# Patient Record
Sex: MALE | Race: WHITE | Employment: UNEMPLOYED | URBAN - METROPOLITAN AREA
[De-identification: names, ages, dates, MRNs, and addresses within clinical notes are randomized per-mention and may not be internally consistent; named-entity substitution may affect disease eponyms.]

---

## 2017-06-20 ENCOUNTER — ANESTHESIA EVENT (OUTPATIENT)
Dept: SURGERY | Age: 52
End: 2017-06-20
Payer: MEDICAID

## 2017-06-21 ENCOUNTER — APPOINTMENT (OUTPATIENT)
Dept: GENERAL RADIOLOGY | Age: 52
End: 2017-06-21
Attending: ORTHOPAEDIC SURGERY
Payer: MEDICAID

## 2017-06-21 ENCOUNTER — ANESTHESIA (OUTPATIENT)
Dept: SURGERY | Age: 52
End: 2017-06-21
Payer: MEDICAID

## 2017-06-21 ENCOUNTER — HOSPITAL ENCOUNTER (OUTPATIENT)
Age: 52
Setting detail: OUTPATIENT SURGERY
Discharge: HOME | End: 2017-06-21
Attending: ORTHOPAEDIC SURGERY | Admitting: ORTHOPAEDIC SURGERY
Payer: MEDICAID

## 2017-06-21 VITALS
OXYGEN SATURATION: 96 % | WEIGHT: 243 LBS | DIASTOLIC BLOOD PRESSURE: 83 MMHG | HEIGHT: 75 IN | HEART RATE: 74 BPM | TEMPERATURE: 97.8 F | BODY MASS INDEX: 30.21 KG/M2 | SYSTOLIC BLOOD PRESSURE: 129 MMHG | RESPIRATION RATE: 16 BRPM

## 2017-06-21 DIAGNOSIS — S43.101A: Primary | ICD-10-CM

## 2017-06-21 PROCEDURE — 74011250636 HC RX REV CODE- 250/636: Performed by: ORTHOPAEDIC SURGERY

## 2017-06-21 PROCEDURE — 74011000250 HC RX REV CODE- 250: Performed by: ANESTHESIOLOGY

## 2017-06-21 PROCEDURE — 76210000016 HC OR PH I REC 1 TO 1.5 HR: Performed by: ORTHOPAEDIC SURGERY

## 2017-06-21 PROCEDURE — 77030020782 HC GWN BAIR PAWS FLX 3M -B: Performed by: ORTHOPAEDIC SURGERY

## 2017-06-21 PROCEDURE — 77030011265 HC ELECTRD BLD HEX COVD -A: Performed by: ORTHOPAEDIC SURGERY

## 2017-06-21 PROCEDURE — 77030012508 HC MSK AIRWY LMA AMBU -A: Performed by: ANESTHESIOLOGY

## 2017-06-21 PROCEDURE — 74011250637 HC RX REV CODE- 250/637: Performed by: ANESTHESIOLOGY

## 2017-06-21 PROCEDURE — 76210000021 HC REC RM PH II 0.5 TO 1 HR: Performed by: ORTHOPAEDIC SURGERY

## 2017-06-21 PROCEDURE — 74011250636 HC RX REV CODE- 250/636

## 2017-06-21 PROCEDURE — 74011000272 HC RX REV CODE- 272: Performed by: ORTHOPAEDIC SURGERY

## 2017-06-21 PROCEDURE — 77030032490 HC SLV COMPR SCD KNE COVD -B: Performed by: ORTHOPAEDIC SURGERY

## 2017-06-21 PROCEDURE — 74011000250 HC RX REV CODE- 250

## 2017-06-21 PROCEDURE — 77030011283 HC ELECTRD NDL COVD -A: Performed by: ORTHOPAEDIC SURGERY

## 2017-06-21 PROCEDURE — 77030028990 HC ADH TISS DERMFLX CHMP -B: Performed by: ORTHOPAEDIC SURGERY

## 2017-06-21 PROCEDURE — 76060000035 HC ANESTHESIA 2 TO 2.5 HR: Performed by: ORTHOPAEDIC SURGERY

## 2017-06-21 PROCEDURE — 77030002922 HC SUT FBRWRE ARTH -B: Performed by: ORTHOPAEDIC SURGERY

## 2017-06-21 PROCEDURE — 77030031139 HC SUT VCRL2 J&J -A: Performed by: ORTHOPAEDIC SURGERY

## 2017-06-21 PROCEDURE — 77030036565 HC WRP CLDTHER ANK S2SG -A: Performed by: ORTHOPAEDIC SURGERY

## 2017-06-21 PROCEDURE — 74011000250 HC RX REV CODE- 250: Performed by: ORTHOPAEDIC SURGERY

## 2017-06-21 PROCEDURE — 74011250636 HC RX REV CODE- 250/636: Performed by: ANESTHESIOLOGY

## 2017-06-21 PROCEDURE — 77030018836 HC SOL IRR NACL ICUM -A: Performed by: ORTHOPAEDIC SURGERY

## 2017-06-21 PROCEDURE — 77030002919 HC SUT FBRTAPE ARTH -B: Performed by: ORTHOPAEDIC SURGERY

## 2017-06-21 PROCEDURE — 77030037875 HC DRSG MEPILEX <16IN BORD MOLN -A: Performed by: ORTHOPAEDIC SURGERY

## 2017-06-21 PROCEDURE — 77030031140 HC SUT VCRL3 J&J -A: Performed by: ORTHOPAEDIC SURGERY

## 2017-06-21 PROCEDURE — 77030020269 HC MISC IMPL: Performed by: ORTHOPAEDIC SURGERY

## 2017-06-21 PROCEDURE — 76010000131 HC OR TIME 2 TO 2.5 HR: Performed by: ORTHOPAEDIC SURGERY

## 2017-06-21 PROCEDURE — 77030027138 HC INCENT SPIROMETER -A: Performed by: ORTHOPAEDIC SURGERY

## 2017-06-21 PROCEDURE — 77030002933 HC SUT MCRYL J&J -A: Performed by: ORTHOPAEDIC SURGERY

## 2017-06-21 RX ORDER — FENTANYL CITRATE 50 UG/ML
INJECTION, SOLUTION INTRAMUSCULAR; INTRAVENOUS AS NEEDED
Status: DISCONTINUED | OUTPATIENT
Start: 2017-06-21 | End: 2017-06-21 | Stop reason: HOSPADM

## 2017-06-21 RX ORDER — PAROXETINE HYDROCHLORIDE 20 MG/1
20 TABLET, FILM COATED ORAL 2 TIMES DAILY
Status: CANCELLED | OUTPATIENT
Start: 2017-06-21

## 2017-06-21 RX ORDER — SODIUM CHLORIDE, SODIUM LACTATE, POTASSIUM CHLORIDE, CALCIUM CHLORIDE 600; 310; 30; 20 MG/100ML; MG/100ML; MG/100ML; MG/100ML
50 INJECTION, SOLUTION INTRAVENOUS CONTINUOUS
Status: DISCONTINUED | OUTPATIENT
Start: 2017-06-21 | End: 2017-06-21 | Stop reason: HOSPADM

## 2017-06-21 RX ORDER — LEVETIRACETAM 500 MG/1
2000 TABLET ORAL 2 TIMES DAILY
Status: CANCELLED | OUTPATIENT
Start: 2017-06-21

## 2017-06-21 RX ORDER — OXYBUTYNIN CHLORIDE 5 MG/1
5 TABLET ORAL
Status: CANCELLED | OUTPATIENT
Start: 2017-06-22

## 2017-06-21 RX ORDER — ONDANSETRON 2 MG/ML
INJECTION INTRAMUSCULAR; INTRAVENOUS AS NEEDED
Status: DISCONTINUED | OUTPATIENT
Start: 2017-06-21 | End: 2017-06-21 | Stop reason: HOSPADM

## 2017-06-21 RX ORDER — OXYCODONE AND ACETAMINOPHEN 10; 325 MG/1; MG/1
1 TABLET ORAL
Status: CANCELLED | OUTPATIENT
Start: 2017-06-21

## 2017-06-21 RX ORDER — PHENOBARBITAL 32.4 MG/1
129.6 TABLET ORAL
Status: CANCELLED | OUTPATIENT
Start: 2017-06-21

## 2017-06-21 RX ORDER — SODIUM CHLORIDE, SODIUM LACTATE, POTASSIUM CHLORIDE, CALCIUM CHLORIDE 600; 310; 30; 20 MG/100ML; MG/100ML; MG/100ML; MG/100ML
125 INJECTION, SOLUTION INTRAVENOUS CONTINUOUS
Status: DISCONTINUED | OUTPATIENT
Start: 2017-06-21 | End: 2017-06-21 | Stop reason: HOSPADM

## 2017-06-21 RX ORDER — FOLIC ACID 1 MG/1
1 TABLET ORAL DAILY
Status: CANCELLED | OUTPATIENT
Start: 2017-06-21

## 2017-06-21 RX ORDER — DEXAMETHASONE SODIUM PHOSPHATE 4 MG/ML
INJECTION, SOLUTION INTRA-ARTICULAR; INTRALESIONAL; INTRAMUSCULAR; INTRAVENOUS; SOFT TISSUE AS NEEDED
Status: DISCONTINUED | OUTPATIENT
Start: 2017-06-21 | End: 2017-06-21 | Stop reason: HOSPADM

## 2017-06-21 RX ORDER — ONDANSETRON 2 MG/ML
4 INJECTION INTRAMUSCULAR; INTRAVENOUS ONCE
Status: DISCONTINUED | OUTPATIENT
Start: 2017-06-21 | End: 2017-06-21 | Stop reason: HOSPADM

## 2017-06-21 RX ORDER — OXYBUTYNIN CHLORIDE 5 MG/1
10 TABLET ORAL
Status: CANCELLED | OUTPATIENT
Start: 2017-06-21

## 2017-06-21 RX ORDER — FENTANYL CITRATE 50 UG/ML
25 INJECTION, SOLUTION INTRAMUSCULAR; INTRAVENOUS
Status: DISPENSED | OUTPATIENT
Start: 2017-06-21 | End: 2017-06-21

## 2017-06-21 RX ORDER — MAGNESIUM SULFATE 100 %
4 CRYSTALS MISCELLANEOUS AS NEEDED
Status: DISCONTINUED | OUTPATIENT
Start: 2017-06-21 | End: 2017-06-21 | Stop reason: HOSPADM

## 2017-06-21 RX ORDER — OXYCODONE AND ACETAMINOPHEN 10; 325 MG/1; MG/1
1 TABLET ORAL
Qty: 40 TAB | Refills: 0 | Status: SHIPPED | OUTPATIENT
Start: 2017-06-21 | End: 2018-06-28

## 2017-06-21 RX ORDER — LORAZEPAM 1 MG/1
1 TABLET ORAL
Status: CANCELLED | OUTPATIENT
Start: 2017-06-21

## 2017-06-21 RX ORDER — INSULIN LISPRO 100 [IU]/ML
INJECTION, SOLUTION INTRAVENOUS; SUBCUTANEOUS ONCE
Status: DISCONTINUED | OUTPATIENT
Start: 2017-06-21 | End: 2017-06-21 | Stop reason: HOSPADM

## 2017-06-21 RX ORDER — GLYCOPYRROLATE 0.2 MG/ML
INJECTION INTRAMUSCULAR; INTRAVENOUS AS NEEDED
Status: DISCONTINUED | OUTPATIENT
Start: 2017-06-21 | End: 2017-06-21 | Stop reason: HOSPADM

## 2017-06-21 RX ORDER — FOLIC ACID 1 MG/1
2 TABLET ORAL
Status: CANCELLED | OUTPATIENT
Start: 2017-06-21

## 2017-06-21 RX ORDER — PHENOBARBITAL 32.4 MG/1
64.8 TABLET ORAL
Status: CANCELLED | OUTPATIENT
Start: 2017-06-22

## 2017-06-21 RX ORDER — LIDOCAINE HYDROCHLORIDE 20 MG/ML
INJECTION, SOLUTION EPIDURAL; INFILTRATION; INTRACAUDAL; PERINEURAL AS NEEDED
Status: DISCONTINUED | OUTPATIENT
Start: 2017-06-21 | End: 2017-06-21 | Stop reason: HOSPADM

## 2017-06-21 RX ORDER — SODIUM CHLORIDE 0.9 % (FLUSH) 0.9 %
5-10 SYRINGE (ML) INJECTION AS NEEDED
Status: DISCONTINUED | OUTPATIENT
Start: 2017-06-21 | End: 2017-06-21 | Stop reason: HOSPADM

## 2017-06-21 RX ORDER — HYDROMORPHONE HYDROCHLORIDE 1 MG/ML
0.5 INJECTION, SOLUTION INTRAMUSCULAR; INTRAVENOUS; SUBCUTANEOUS
Status: DISCONTINUED | OUTPATIENT
Start: 2017-06-21 | End: 2017-06-21 | Stop reason: HOSPADM

## 2017-06-21 RX ORDER — PROPOFOL 10 MG/ML
INJECTION, EMULSION INTRAVENOUS AS NEEDED
Status: DISCONTINUED | OUTPATIENT
Start: 2017-06-21 | End: 2017-06-21 | Stop reason: HOSPADM

## 2017-06-21 RX ORDER — LABETALOL HYDROCHLORIDE 5 MG/ML
10 INJECTION, SOLUTION INTRAVENOUS ONCE
Status: COMPLETED | OUTPATIENT
Start: 2017-06-21 | End: 2017-06-21

## 2017-06-21 RX ORDER — CEFAZOLIN SODIUM 2 G/50ML
2 SOLUTION INTRAVENOUS ONCE
Status: COMPLETED | OUTPATIENT
Start: 2017-06-21 | End: 2017-06-21

## 2017-06-21 RX ORDER — OXYCODONE AND ACETAMINOPHEN 5; 325 MG/1; MG/1
1 TABLET ORAL AS NEEDED
Status: DISCONTINUED | OUTPATIENT
Start: 2017-06-21 | End: 2017-06-21 | Stop reason: HOSPADM

## 2017-06-21 RX ORDER — MIDAZOLAM HYDROCHLORIDE 1 MG/ML
INJECTION, SOLUTION INTRAMUSCULAR; INTRAVENOUS AS NEEDED
Status: DISCONTINUED | OUTPATIENT
Start: 2017-06-21 | End: 2017-06-21 | Stop reason: HOSPADM

## 2017-06-21 RX ORDER — NALOXONE HYDROCHLORIDE 0.4 MG/ML
0.1 INJECTION, SOLUTION INTRAMUSCULAR; INTRAVENOUS; SUBCUTANEOUS
Status: DISCONTINUED | OUTPATIENT
Start: 2017-06-21 | End: 2017-06-21 | Stop reason: HOSPADM

## 2017-06-21 RX ORDER — DEXTROSE 50 % IN WATER (D50W) INTRAVENOUS SYRINGE
25-50 AS NEEDED
Status: DISCONTINUED | OUTPATIENT
Start: 2017-06-21 | End: 2017-06-21 | Stop reason: HOSPADM

## 2017-06-21 RX ADMIN — SODIUM CHLORIDE, SODIUM LACTATE, POTASSIUM CHLORIDE, AND CALCIUM CHLORIDE 125 ML/HR: 600; 310; 30; 20 INJECTION, SOLUTION INTRAVENOUS at 06:34

## 2017-06-21 RX ADMIN — MIDAZOLAM HYDROCHLORIDE 2 MG: 1 INJECTION, SOLUTION INTRAMUSCULAR; INTRAVENOUS at 07:33

## 2017-06-21 RX ADMIN — HYDROMORPHONE HYDROCHLORIDE 0.5 MG: 1 INJECTION, SOLUTION INTRAMUSCULAR; INTRAVENOUS; SUBCUTANEOUS at 10:33

## 2017-06-21 RX ADMIN — LABETALOL HYDROCHLORIDE 10 MG: 5 INJECTION, SOLUTION INTRAVENOUS at 10:33

## 2017-06-21 RX ADMIN — FENTANYL CITRATE 25 MCG: 50 INJECTION, SOLUTION INTRAMUSCULAR; INTRAVENOUS at 10:44

## 2017-06-21 RX ADMIN — FENTANYL CITRATE 50 MCG: 50 INJECTION, SOLUTION INTRAMUSCULAR; INTRAVENOUS at 09:12

## 2017-06-21 RX ADMIN — DEXAMETHASONE SODIUM PHOSPHATE 4 MG: 4 INJECTION, SOLUTION INTRA-ARTICULAR; INTRALESIONAL; INTRAMUSCULAR; INTRAVENOUS; SOFT TISSUE at 07:45

## 2017-06-21 RX ADMIN — FENTANYL CITRATE 100 MCG: 50 INJECTION, SOLUTION INTRAMUSCULAR; INTRAVENOUS at 07:42

## 2017-06-21 RX ADMIN — SODIUM CHLORIDE, SODIUM LACTATE, POTASSIUM CHLORIDE, AND CALCIUM CHLORIDE: 600; 310; 30; 20 INJECTION, SOLUTION INTRAVENOUS at 08:28

## 2017-06-21 RX ADMIN — FENTANYL CITRATE 50 MCG: 50 INJECTION, SOLUTION INTRAMUSCULAR; INTRAVENOUS at 08:27

## 2017-06-21 RX ADMIN — OXYCODONE AND ACETAMINOPHEN 1 TABLET: 5; 325 TABLET ORAL at 11:39

## 2017-06-21 RX ADMIN — CEFAZOLIN SODIUM 2 G: 2 SOLUTION INTRAVENOUS at 07:35

## 2017-06-21 RX ADMIN — FENTANYL CITRATE 50 MCG: 50 INJECTION, SOLUTION INTRAMUSCULAR; INTRAVENOUS at 09:15

## 2017-06-21 RX ADMIN — ONDANSETRON 4 MG: 2 INJECTION INTRAMUSCULAR; INTRAVENOUS at 07:45

## 2017-06-21 RX ADMIN — FENTANYL CITRATE 50 MCG: 50 INJECTION, SOLUTION INTRAMUSCULAR; INTRAVENOUS at 08:49

## 2017-06-21 RX ADMIN — HYDROMORPHONE HYDROCHLORIDE 0.5 MG: 1 INJECTION, SOLUTION INTRAMUSCULAR; INTRAVENOUS; SUBCUTANEOUS at 10:43

## 2017-06-21 RX ADMIN — LIDOCAINE HYDROCHLORIDE 80 MG: 20 INJECTION, SOLUTION EPIDURAL; INFILTRATION; INTRACAUDAL; PERINEURAL at 07:42

## 2017-06-21 RX ADMIN — GLYCOPYRROLATE 0.2 MG: 0.2 INJECTION INTRAMUSCULAR; INTRAVENOUS at 07:33

## 2017-06-21 RX ADMIN — PROPOFOL 200 MG: 10 INJECTION, EMULSION INTRAVENOUS at 07:42

## 2017-06-21 NOTE — OP NOTES
[address] [place] [address]  OPERATIVE REPORT    Name:  Kassi Curry  MR#:  [id number]  :  1965  Account #:  [de-identified]  Date of Adm:  2017  Date of Surgery:  2017      PREOPERATIVE DIAGNOSIS: Type 3 acromioclavicular separation,  right. POSTOPERATIVE DIAGNOSIS: Type 3 acromioclavicular separation,  right. PROCEDURES PERFORMED: Repair of a right acromioclavicular  joint with allograft and FiberWire fixation. ESTIMATED BLOOD LOSS: 10 mL. SPECIMENS REMOVED: None. ANESTHESIA: General.    INDICATIONS FOR THE PROCEDURE: The patient is a 58-year-old  right hand dominant gentleman who had increasing pain in his  shoulder. Radiographs demonstrated a type 3 AC separation. This  injury was approximately 1 month ago. The indications, risks, benefits,  complications, alternative forms of therapy, and expected outcomes  were explained. He seems to understand and agrees to proceed. DESCRIPTION OF OPERATION: The patient was brought to the  operating room and after successful induction of anesthesia, the  patient's right upper extremity was prepped and draped with  ChloraPrep solution. An incision was started at the level of the clavicle,  3 cm proximal to the [facility] joint. The deltoid was split. The medial and  lateral margins of the coracoid process were identified. The tip of the  coracoid was subsequently identified. The guide was placed and the  footprint of the guide was placed on the undersurface of the coracoid  and confirmed on C-arm. A 3 mm wire was drilled through both  cortices of the clavicle and both cortices of the coracoid. The guidewire  was the initial suture with the Dog Bone and the button was placed on  the undersurface of the coracoid and well fixed. The second Dog Bone  EndoButton was placed in a parallel fashion with the clavicle. The  sutures were  passed and paired. The [facility] joint was reduced and the  sutures were tied.  There was an anatomical reduction of the [facility] joint. A suture passer was then used to guide the passage of the end of the  retention sutures for the allograft. These were brought through the  posterior aspect of the clavicle. The end of the graft was then placed  anteriorly to the clavicle. The 2 ends were then pulled in opposite  direction in order to further augment the repair and the graft was sewn  together using interrupted sutures of #2 FiberWire. The wound was  copiously irrigated and drained. The subcutaneous tissue was repaired  using interrupted sutures of 3-0 Vicryl and the skin was repaired using  a 3-0 Monocryl. Suture line was reinforced with Steri-Strips and Prineo  and sterile dressing was applied. No complications were encountered. The patient was placed in a sling. He tolerated the procedure well and  was wheeled to the recovery room in stable condition.         Melo Torres MD DC / Miles Burns  D:  06/21/2017   10:01  T:  06/21/2017   10:34  Job #:  [id number]

## 2017-06-21 NOTE — ANESTHESIA PREPROCEDURE EVALUATION
Anesthetic History   No history of anesthetic complications            Review of Systems / Medical History  Patient summary reviewed, nursing notes reviewed and pertinent labs reviewed    Pulmonary    COPD    Sleep apnea           Neuro/Psych     seizures    Psychiatric history     Cardiovascular  Within defined limits                     GI/Hepatic/Renal  Within defined limits              Endo/Other        Arthritis     Other Findings              Physical Exam    Airway  Mallampati: IV  TM Distance: 4 - 6 cm  Neck ROM: normal range of motion   Mouth opening: Normal     Cardiovascular  Regular rate and rhythm,  S1 and S2 normal,  no murmur, click, rub, or gallop             Dental    Dentition: Poor dentition     Pulmonary  Breath sounds clear to auscultation               Abdominal  Abdominal exam normal       Other Findings            Anesthetic Plan    ASA: 3  Anesthesia type: general          Induction: Intravenous  Anesthetic plan and risks discussed with: Family and Patient

## 2017-06-21 NOTE — PERIOP NOTES
Handoff Report from Operating Room to PACU   Report received from [place], CRNA and Melinda Mcghee RN regarding patient, Savannah Travis . Surgeon(s): MD Arben and Procedure confirmed with allergies and dressings discussed. Anesthesia type, drugs, patient history, complications, estimated blood loss, vital signs, intake and output, and last pain medication, lines, reversal medications and temperature were reviewed. PACU monitoring initiated. Non-rebreather mask with 10 liters 02 applied. 02Sat 100%. Patient is drowsy, able to Follow commands. Dressing to right shoulder CDI, PIV #20 g  Left hand, infusing without difficulty. See Doc flowsheet for physical assessment details.    Analisa Boston RN'

## 2017-06-21 NOTE — ANESTHESIA POSTPROCEDURE EVALUATION
Post-Anesthesia Evaluation and Assessment    Cardiovascular Function/Vital Signs  Visit Vitals    /84    Pulse 75    Temp 36.1 °C (97 °F)    Resp 11    Ht 6' 3\" (1.905 m)    Wt 110.2 kg (243 lb)    SpO2 97%    BMI 30.37 kg/m2       Patient is status post Procedure(s):  OPEN REDUCTION INTERNAL FIXATION RIGHT ACROMLOCLAVICULAR JOINT W/C-ARM. Nausea/Vomiting: Controlled. Postoperative hydration reviewed and adequate. Pain:  Pain Scale 1: Numeric (0 - 10) (06/21/17 1021)  Pain Intensity 1: 0 (06/21/17 1021)   Managed. Neurological Status:   Neuro (WDL): Within Defined Limits (06/21/17 1021)   At baseline. Mental Status and Level of Consciousness: Baseline and stable. Pulmonary Status:   O2 Device: Room air (06/21/17 1021)   Adequate oxygenation and airway patent. Complications related to anesthesia: None    Post-anesthesia assessment completed. No concerns. Patient has met all discharge requirements.     Signed By: Gem Foley MD

## 2017-06-21 NOTE — DISCHARGE INSTRUCTIONS
Follow all post op instructions from Dr. Nghia López office with any specific post op questions or concerns at [phone] - [id number]  Take prescription as directed  Ice pack to surgical site on and off x 48 hours  Avoid heavy lifting- pushing, pulling with right arm  Follow up with Dr. Jamaal López in 1 week            DISCHARGE SUMMARY from Nurse    The following personal items are in your possession at time of discharge:       Visual Aid: None     Home Medications: Sent home (given to wife)  Jewelry: Ring (given to spouse)                   PATIENT INSTRUCTIONS:    After general anesthesia or intravenous sedation, for 24 hours or while taking prescription Narcotics:  · Limit your activities  · Do not drive and operate hazardous machinery  · Do not make important personal or business decisions  · Do  not drink alcoholic beverages  · If you have not urinated within 8 hours after discharge, please contact your surgeon on call. Report the following to your surgeon:  · Excessive pain, swelling, redness or odor of or around the surgical area  · Temperature over 100.5  · Nausea and vomiting lasting longer than 4 hours or if unable to take medications  · Any signs of decreased circulation or nerve impairment to extremity: change in color, persistent  numbness, tingling, coldness or increase pain  · Any questions        What to do at Home:  Recommended activity: Ambulate in house, No lifting, Driving, or Strenuous exercise until advised and No heavy lifting until advised    If you experience any of the following symptoms fever, chills, uncontrollable pain, active bleeding, circulation changes, please follow up with Dr. Jamaal López. *  Please give a list of your current medications to your Primary Care Provider. *  Please update this list whenever your medications are discontinued, doses are      changed, or new medications (including over-the-counter products) are added.     *  Please carry medication information at all times in case of emergency situations. These are general instructions for a healthy lifestyle:    No smoking/ No tobacco products/ Avoid exposure to second hand smoke    Surgeon General's Warning:  Quitting smoking now greatly reduces serious risk to your health. Obesity, smoking, and sedentary lifestyle greatly increases your risk for illness    A healthy diet, regular physical exercise & weight monitoring are important for maintaining a healthy lifestyle    You may be retaining fluid if you have a history of heart failure or if you experience any of the following symptoms:  Weight gain of 3 pounds or more overnight or 5 pounds in a week, increased swelling in our hands or feet or shortness of breath while lying flat in bed. Please call your doctor as soon as you notice any of these symptoms; do not wait until your next office visit. Recognize signs and symptoms of STROKE:    F-face looks uneven    A-arms unable to move or move unevenly    S-speech slurred or non-existent    T-time-call 911 as soon as signs and symptoms begin-DO NOT go       Back to bed or wait to see if you get better-TIME IS BRAIN. Warning Signs of HEART ATTACK     Call 911 if you have these symptoms:   Chest discomfort. Most heart attacks involve discomfort in the center of the chest that lasts more than a few minutes, or that goes away and comes back. It can feel like uncomfortable pressure, squeezing, fullness, or pain.  Discomfort in other areas of the upper body. Symptoms can include pain or discomfort in one or both arms, the back, neck, jaw, or stomach.  Shortness of breath with or without chest discomfort.  Other signs may include breaking out in a cold sweat, nausea, or lightheadedness. Don't wait more than five minutes to call 911 - MINUTES MATTER! Fast action can save your life. Calling 911 is almost always the fastest way to get lifesaving treatment.  Emergency Medical Services staff can begin treatment when they arrive -- up to an hour sooner than if someone gets to the hospital by car. Patient armband removed and shredded    The discharge information has been reviewed with the patient and spouse. The patient and spouse verbalized understanding. Discharge medications reviewed with the patient and spouse and appropriate educational materials and side effects teaching were provided.

## 2017-06-21 NOTE — BRIEF OP NOTE
BRIEF OPERATIVE NOTE    Date of Procedure: 6/21/2017   Preoperative Diagnosis: Type III DISLOCATION OF RIGHT ACROMIOCLAVICULR JOINT INITIAL ENCOUNTER  Postoperative Diagnosis: Typr III DISLOCATION OF RIGHT ACROMIOCLAVICULR JOINT INITIAL ENCOUNTER    Procedure(s):  OPEN REDUCTION INTERNAL FIXATION RIGHT ACROMLOCLAVICULAR JOINT W/C-ARM  Surgeon(s) and Role:     * Tammy Cuellar MD - Primary         Assistant Staff:       Surgical Staff:  Circ-1: Katherine Hutchins  Circ-2: Alena Chapa RN  Radiology Technician: Dearl Minus  Scrub Tech-1: Yola Downey  Surg Asst-1: Taunya Romberg  Event Time In   Incision Start 0804   Incision Close 0951     Anesthesia: General   Estimated Blood Loss: minimal  Specimens: * No specimens in log *   Findings: ACJ separation   Complications: none  Implants:   Implant Name Type Inv.  Item Serial No.  Lot No. LRB No. Used Action   Implant System, Acute [facility] Repair    [employer] [id number] Right 1 Implanted   FlexiGraft Lateral Ankle     [id number]     Right 1 Implanted
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
## 2017-06-21 NOTE — IP AVS SNAPSHOT
Carl Osorio 
 
 
 [address] [facility] [address] 
[phone] Patient: Calton Rubinstein MRN: [id number] HKD:0/3/3701 You are allergic to the following Allergen Reactions Codeine Rash Recent Documentation Height Weight BMI Smoking Status 1.905 m 110.2 kg 30.37 kg/m2 Current Every Day Smoker Emergency Contacts Name Discharge Info Relation Home Work Mobile Any Mathews DISCHARGE CAREGIVER [3] Spouse [3] [phone] About your hospitalization You were admitted on:  June 21, 2017 [facility] You were discharged on:  June 21, 2017 Unit phone number:  [phone] Why you were hospitalized Your primary diagnosis was:  Not on File Providers Seen During Your Hospitalizations Provider Role Specialty Primary office phone Tammy Cuellar MD Attending Provider Orthopedic Surgery [phone] Your Primary Care Physician (PCP) Primary Care Physician Office Phone Office Fax Jsabir Ha [phone] Follow-up Information Follow up With Details Comments Contact Info [facility] [address] [place] [address] 
[phone] Tammy Cuellar MD Follow up in 1 week(s)  [address] [place] [address] [address] [place] [address] 
[phone] Current Discharge Medication List  
  
START taking these medications Dose & Instructions Dispensing Information Comments Morning Noon Evening Bedtime  
 oxyCODONE-acetaminophen  mg per tablet Commonly known as:  PERCOCET 10 Your last dose was: Your next dose is:    
   
   
 Dose:  1 Tab Take 1 Tab by mouth every six (6) hours as needed for Pain. Max Daily Amount: 4 Tabs. Indications: Pain Quantity:  40 Tab Refills:  0 CONTINUE these medications which have NOT CHANGED Dose & Instructions Dispensing Information Comments Morning Noon Evening Bedtime  
 albuterol 90 mcg/actuation inhaler Commonly known as:  PROVENTIL HFA, VENTOLIN HFA, PROAIR HFA Your last dose was: Your next dose is:    
   
   
 Dose:  2 Puff Take 2 Puffs by inhalation every four (4) hours as needed for Shortness of Breath (sob with coughing). Indications: Chronic Obstructive Pulmonary Disease Refills:  0  
     
   
   
   
  
 ATIVAN 1 mg tablet Generic drug:  LORazepam  
   
Your last dose was: Your next dose is:    
   
   
 Dose:  1 mg Take 1 mg by mouth every six (6) hours as needed for Anxiety. Indications: anxiety Refills:  0  
     
   
   
   
  
 * DILANTIN PO Your last dose was: Your next dose is:    
   
   
 Dose:  200 mg Take 200 mg by mouth Daily (before breakfast). Refills:  0  
     
   
   
   
  
 * DILANTIN PO Your last dose was: Your next dose is:    
   
   
 Dose:  300 mg Take 300 mg by mouth nightly. Refills:  0  
     
   
   
   
  
 * folic acid 1 mg tablet Commonly known as:  Robyn Your last dose was: Your next dose is:    
   
   
 Dose:  1 mg Take 1 mg by mouth daily. Refills:  0  
     
   
   
   
  
 * folic acid 1 mg tablet Commonly known as:  [employer] Your last dose was: Your next dose is:    
   
   
 Dose:  2 mg Take 2 mg by mouth nightly. Refills:  0 KEPPRA 1,000 mg tablet Generic drug:  levETIRAcetam  
   
Your last dose was: Your next dose is:    
   
   
 Dose:  2000 mg Take 2,000 mg by mouth two (2) times a day. Indications: TONIC-CLONIC EPILEPSY TREATMENT ADJUNCT Refills:  0 LIDODERM 5 % Generic drug:  lidocaine Your last dose was: Your next dose is:    
   
   
 Dose:  1 Patch 1 Patch by TransDERmal route every twenty-four (24) hours. Apply patch to the affected area for 12 hours a day and remove for 12 hours a day. One or two patches. One to back and or one to shoulder Refills:  0  
     
   
   
   
  
 * oxybutynin 5 mg tablet Commonly known as:  XBXGXAKF Your last dose was: Your next dose is:    
   
   
 Dose:  5 mg Take 5 mg by mouth Daily (before breakfast). Indications: NEUROGENIC BLADDER Refills:  0  
     
   
   
   
  
 * oxybutynin 5 mg tablet Commonly known as:  HIPPZAJT Your last dose was: Your next dose is:    
   
   
 Dose:  10 mg Take 10 mg by mouth nightly. Refills:  0 PAXIL 20 mg tablet Generic drug:  PARoxetine Your last dose was: Your next dose is:    
   
   
 Dose:  20 mg Take 20 mg by mouth two (2) times a day. Refills:  0  
     
   
   
   
  
 * PHENobarbital 64.8 mg tablet Commonly known as:  LUMINAL Your last dose was: Your next dose is:    
   
   
 Dose:  64.8 mg Take 64.8 mg by mouth Daily (before breakfast). Indications: seizure disorder Refills:  0  
     
   
   
   
  
 * PHENobarbital 64.8 mg tablet Commonly known as:  LUMINAL Your last dose was: Your next dose is:    
   
   
 Dose:  129.6 mg Take 129.6 mg by mouth nightly. Indications: seizure disorder Refills:  0  
     
   
   
   
  
 VITAMIN D3 2,000 unit Tab Generic drug:  cholecalciferol (vitamin D3) Your last dose was: Your next dose is:    
   
   
 Dose:  1 Tab Take 1 Tab by mouth daily. Refills:  0  
     
   
   
   
  
 * Notice: This list has 8 medication(s) that are the same as other medications prescribed for you. Read the directions carefully, and ask your doctor or other care provider to review them with you. Where to Get Your Medications Information on where to get these meds will be given to you by the nurse or doctor. ! Ask your nurse or doctor about these medications  
  oxyCODONE-acetaminophen  mg per tablet Discharge Instructions Follow all post op instructions from Dr. Dario Puga Contact Dr. Dario Puga office with any specific post op questions or concerns at [phone] Take prescription as directed Ice pack to surgical site on and off x 48 hours Avoid heavy lifting- pushing, pulling with right arm Follow up with Dr. Dario Puga in 1 week DISCHARGE SUMMARY from Nurse The following personal items are in your possession at time of discharge: 
 
  
Visual Aid: None Home Medications: Sent home (given to wife) Jewelry: Ring (given to spouse) PATIENT INSTRUCTIONS: 
 
 
F-face looks uneven A-arms unable to move or move unevenly S-speech slurred or non-existent T-time-call 911 as soon as signs and symptoms begin-DO NOT go Back to bed or wait to see if you get better-TIME IS BRAIN. Warning Signs of HEART ATTACK Call 911 if you have these symptoms: 
? Chest discomfort. Most heart attacks involve discomfort in the center of the chest that lasts more than a few minutes, or that goes away and comes back. It can feel like uncomfortable pressure, squeezing, fullness, or pain. ? Discomfort in other areas of the upper body. Symptoms can include pain or discomfort in one or both arms, the back, neck, jaw, or stomach. ? Shortness of breath with or without chest discomfort. ? Other signs may include breaking out in a cold sweat, nausea, or lightheadedness. Don't wait more than five minutes to call [address]! Fast action can save your life. Calling 911 is almost always the fastest way to get lifesaving treatment. Emergency Medical Services staff can begin treatment when they arrive  up to an hour sooner than if someone gets to the hospital by car. Patient armband removed and shredded The discharge information has been reviewed with the patient and spouse. The patient and spouse verbalized understanding. Discharge medications reviewed with the patient and spouse and appropriate educational materials and side effects teaching were provided. Discharge Orders Procedure Order Date Status Priority Quantity Spec Type Associated Dx CALL YOUR DOCTOR For: Redness, tenderness, or signs of infection. 06/21/17 1018 Normal Routine 1  Dislocation of clavicle, closed, right, initial encounter [[id number]] Questions: For:  Redness, tenderness, or signs of infection. ACTIVITY AFTER DISCHARGE Patient should: Other (specify) 06/21/17 1018 Normal Routine 1  Dislocation of clavicle, closed, right, initial encounter [[id number]] Schedule Instructions:  Maintain the sling until the f/u appointment Questions: Patient should: Other (specify) DIET REGULAR No added salt 06/21/17 1018 Normal Routine 1  Dislocation of clavicle, closed, right, initial encounter [[id number]] Questions: Additional options:  No added salt DRESSING, DO NOT REMOVE 06/21/17 1018 Normal Routine 1  Dislocation of clavicle, closed, right, initial encounter [[id number]] Comments:  Keep clean, dry and intact until next clinic visit. Introducing [facility]! [facility] introduces [employer] patient portal. Now you can access parts of your medical record, email your doctor's office, and request medication refills online. 1. In your internet browser, go to [email or web address] 2. Click on the First Time User? Click Here link in the Sign In box. You will see the New Member Sign Up page. 3. Enter your [employer] Access Code exactly as it appears below. You will not need to use this code after youve completed the sign-up process. If you do not sign up before the expiration date, you must request a new code. · [employer] Access Code: [email or web address] Expires: 9/19/2017  5:13 AM 
 
4. Enter the last four digits of your Social Security Number (xxxx) and Date of Birth (mm/dd/yyyy) as indicated and click Submit. You will be taken to the next sign-up page. 5. Create a [employer] ID. This will be your [employer] login ID and cannot be changed, so think of one that is secure and easy to remember. 6. Create a [employer] password. You can change your password at any time. 7. Enter your Password Reset Question and Answer. This can be used at a later time if you forget your password. 8. Enter your e-mail address. You will receive e-mail notification when new information is available in [address] 9. Click Sign Up. You can now view and download portions of your medical record. 10. Click the Download Summary menu link to download a portable copy of your medical information. If you have questions, please visit the Frequently Asked Questions section of the [employer] website. Remember, [employer] is NOT to be used for urgent needs. For medical emergencies, dial 911. Now available from your iPhone and Android! General Information Please provide this summary of care documentation to your next provider. Patient Signature:  ____________________________________________________________ Date:  ____________________________________________________________  
  
Faustino Dany Provider Signature:  ____________________________________________________________ Date:  ____________________________________________________________

## 2018-06-28 ENCOUNTER — ANESTHESIA EVENT (OUTPATIENT)
Dept: SURGERY | Age: 53
End: 2018-06-28
Payer: MEDICAID

## 2018-06-29 ENCOUNTER — HOSPITAL ENCOUNTER (OUTPATIENT)
Dept: GENERAL RADIOLOGY | Age: 53
Discharge: HOME | End: 2018-06-29
Attending: ORTHOPAEDIC SURGERY
Payer: MEDICAID

## 2018-06-29 ENCOUNTER — HOSPITAL ENCOUNTER (OUTPATIENT)
Age: 53
Setting detail: OUTPATIENT SURGERY
Discharge: HOME | End: 2018-06-29
Attending: ORTHOPAEDIC SURGERY | Admitting: ORTHOPAEDIC SURGERY
Payer: MEDICAID

## 2018-06-29 ENCOUNTER — ANESTHESIA (OUTPATIENT)
Dept: SURGERY | Age: 53
End: 2018-06-29
Payer: MEDICAID

## 2018-06-29 VITALS
TEMPERATURE: 97.3 F | DIASTOLIC BLOOD PRESSURE: 83 MMHG | BODY MASS INDEX: 31.11 KG/M2 | HEART RATE: 78 BPM | SYSTOLIC BLOOD PRESSURE: 139 MMHG | HEIGHT: 75 IN | WEIGHT: 250.19 LBS | OXYGEN SATURATION: 96 % | RESPIRATION RATE: 12 BRPM

## 2018-06-29 DIAGNOSIS — S42.009A: ICD-10-CM

## 2018-06-29 DIAGNOSIS — S42.022A CLOSED DISPLACED FRACTURE OF SHAFT OF LEFT CLAVICLE, INITIAL ENCOUNTER: Primary | ICD-10-CM

## 2018-06-29 PROCEDURE — 77030031139 HC SUT VCRL2 J&J -A: Performed by: ORTHOPAEDIC SURGERY

## 2018-06-29 PROCEDURE — 76010000149 HC OR TIME 1 TO 1.5 HR: Performed by: ORTHOPAEDIC SURGERY

## 2018-06-29 PROCEDURE — 77030032490 HC SLV COMPR SCD KNE COVD -B: Performed by: ORTHOPAEDIC SURGERY

## 2018-06-29 PROCEDURE — 74011250636 HC RX REV CODE- 250/636: Performed by: ANESTHESIOLOGY

## 2018-06-29 PROCEDURE — 77030020268 HC MISC GENERAL SUPPLY: Performed by: ORTHOPAEDIC SURGERY

## 2018-06-29 PROCEDURE — 76210000020 HC REC RM PH II FIRST 0.5 HR: Performed by: ORTHOPAEDIC SURGERY

## 2018-06-29 PROCEDURE — 77030011283 HC ELECTRD NDL COVD -A: Performed by: ORTHOPAEDIC SURGERY

## 2018-06-29 PROCEDURE — 77030018836 HC SOL IRR NACL ICUM -A: Performed by: ORTHOPAEDIC SURGERY

## 2018-06-29 PROCEDURE — 77030020269 HC MISC IMPL: Performed by: ORTHOPAEDIC SURGERY

## 2018-06-29 PROCEDURE — 74011000250 HC RX REV CODE- 250

## 2018-06-29 PROCEDURE — C1713 ANCHOR/SCREW BN/BN,TIS/BN: HCPCS | Performed by: ORTHOPAEDIC SURGERY

## 2018-06-29 PROCEDURE — 74011250636 HC RX REV CODE- 250/636

## 2018-06-29 PROCEDURE — 77030037875 HC DRSG MEPILEX <16IN BORD MOLN -A: Performed by: ORTHOPAEDIC SURGERY

## 2018-06-29 PROCEDURE — 77030020782 HC GWN BAIR PAWS FLX 3M -B: Performed by: ORTHOPAEDIC SURGERY

## 2018-06-29 PROCEDURE — 74011250636 HC RX REV CODE- 250/636: Performed by: ORTHOPAEDIC SURGERY

## 2018-06-29 PROCEDURE — 77030031140 HC SUT VCRL3 J&J -A: Performed by: ORTHOPAEDIC SURGERY

## 2018-06-29 PROCEDURE — 77030028990 HC ADH TISS DERMFLX CHMP -B: Performed by: ORTHOPAEDIC SURGERY

## 2018-06-29 PROCEDURE — 76210000016 HC OR PH I REC 1 TO 1.5 HR: Performed by: ORTHOPAEDIC SURGERY

## 2018-06-29 PROCEDURE — 77030003861 HC BIT DRL STRY -C: Performed by: ORTHOPAEDIC SURGERY

## 2018-06-29 PROCEDURE — 77030012508 HC MSK AIRWY LMA AMBU -A: Performed by: ANESTHESIOLOGY

## 2018-06-29 PROCEDURE — 74011000250 HC RX REV CODE- 250: Performed by: ORTHOPAEDIC SURGERY

## 2018-06-29 PROCEDURE — 77030038020 HC MANFLD NEPTUNE STRY -B: Performed by: ORTHOPAEDIC SURGERY

## 2018-06-29 PROCEDURE — 76060000033 HC ANESTHESIA 1 TO 1.5 HR: Performed by: ORTHOPAEDIC SURGERY

## 2018-06-29 PROCEDURE — 77030002933 HC SUT MCRYL J&J -A: Performed by: ORTHOPAEDIC SURGERY

## 2018-06-29 PROCEDURE — 77030011265 HC ELECTRD BLD HEX COVD -A: Performed by: ORTHOPAEDIC SURGERY

## 2018-06-29 DEVICE — LOCKING SCREW
Type: IMPLANTABLE DEVICE | Site: SHOULDER | Status: FUNCTIONAL
Brand: VARIAX

## 2018-06-29 RX ORDER — SODIUM CHLORIDE 0.9 % (FLUSH) 0.9 %
5-10 SYRINGE (ML) INJECTION AS NEEDED
Status: DISCONTINUED | OUTPATIENT
Start: 2018-06-29 | End: 2018-06-29 | Stop reason: HOSPADM

## 2018-06-29 RX ORDER — FENTANYL CITRATE 50 UG/ML
50 INJECTION, SOLUTION INTRAMUSCULAR; INTRAVENOUS
Status: DISCONTINUED | OUTPATIENT
Start: 2018-06-29 | End: 2018-06-29 | Stop reason: HOSPADM

## 2018-06-29 RX ORDER — FENTANYL CITRATE 50 UG/ML
INJECTION, SOLUTION INTRAMUSCULAR; INTRAVENOUS AS NEEDED
Status: DISCONTINUED | OUTPATIENT
Start: 2018-06-29 | End: 2018-06-29 | Stop reason: HOSPADM

## 2018-06-29 RX ORDER — GLYCOPYRROLATE 0.2 MG/ML
INJECTION INTRAMUSCULAR; INTRAVENOUS AS NEEDED
Status: DISCONTINUED | OUTPATIENT
Start: 2018-06-29 | End: 2018-06-29 | Stop reason: HOSPADM

## 2018-06-29 RX ORDER — SODIUM CHLORIDE, SODIUM LACTATE, POTASSIUM CHLORIDE, CALCIUM CHLORIDE 600; 310; 30; 20 MG/100ML; MG/100ML; MG/100ML; MG/100ML
125 INJECTION, SOLUTION INTRAVENOUS CONTINUOUS
Status: DISCONTINUED | OUTPATIENT
Start: 2018-06-29 | End: 2018-06-29 | Stop reason: HOSPADM

## 2018-06-29 RX ORDER — LIDOCAINE HYDROCHLORIDE 20 MG/ML
INJECTION, SOLUTION EPIDURAL; INFILTRATION; INTRACAUDAL; PERINEURAL AS NEEDED
Status: DISCONTINUED | OUTPATIENT
Start: 2018-06-29 | End: 2018-06-29 | Stop reason: HOSPADM

## 2018-06-29 RX ORDER — PROPOFOL 10 MG/ML
INJECTION, EMULSION INTRAVENOUS AS NEEDED
Status: DISCONTINUED | OUTPATIENT
Start: 2018-06-29 | End: 2018-06-29 | Stop reason: HOSPADM

## 2018-06-29 RX ORDER — ONDANSETRON 2 MG/ML
INJECTION INTRAMUSCULAR; INTRAVENOUS AS NEEDED
Status: DISCONTINUED | OUTPATIENT
Start: 2018-06-29 | End: 2018-06-29 | Stop reason: HOSPADM

## 2018-06-29 RX ORDER — CEFAZOLIN SODIUM/WATER 2 G/20 ML
2 SYRINGE (ML) INTRAVENOUS ONCE
Status: COMPLETED | OUTPATIENT
Start: 2018-06-29 | End: 2018-06-29

## 2018-06-29 RX ORDER — ACETAMINOPHEN 10 MG/ML
1000 INJECTION, SOLUTION INTRAVENOUS ONCE
Status: COMPLETED | OUTPATIENT
Start: 2018-06-29 | End: 2018-06-29

## 2018-06-29 RX ORDER — MIDAZOLAM HYDROCHLORIDE 1 MG/ML
INJECTION, SOLUTION INTRAMUSCULAR; INTRAVENOUS AS NEEDED
Status: DISCONTINUED | OUTPATIENT
Start: 2018-06-29 | End: 2018-06-29 | Stop reason: HOSPADM

## 2018-06-29 RX ADMIN — FENTANYL CITRATE 50 MCG: 50 INJECTION, SOLUTION INTRAMUSCULAR; INTRAVENOUS at 08:37

## 2018-06-29 RX ADMIN — LIDOCAINE HYDROCHLORIDE 80 MG: 20 INJECTION, SOLUTION EPIDURAL; INFILTRATION; INTRACAUDAL; PERINEURAL at 07:52

## 2018-06-29 RX ADMIN — Medication 2 G: at 07:46

## 2018-06-29 RX ADMIN — FENTANYL CITRATE 100 MCG: 50 INJECTION, SOLUTION INTRAMUSCULAR; INTRAVENOUS at 07:52

## 2018-06-29 RX ADMIN — FENTANYL CITRATE 50 MCG: 50 INJECTION, SOLUTION INTRAMUSCULAR; INTRAVENOUS at 09:53

## 2018-06-29 RX ADMIN — SODIUM CHLORIDE, SODIUM LACTATE, POTASSIUM CHLORIDE, AND CALCIUM CHLORIDE 125 ML/HR: 600; 310; 30; 20 INJECTION, SOLUTION INTRAVENOUS at 06:17

## 2018-06-29 RX ADMIN — FENTANYL CITRATE 50 MCG: 50 INJECTION, SOLUTION INTRAMUSCULAR; INTRAVENOUS at 08:22

## 2018-06-29 RX ADMIN — ACETAMINOPHEN 1000 MG: 10 INJECTION, SOLUTION INTRAVENOUS at 07:43

## 2018-06-29 RX ADMIN — MIDAZOLAM HYDROCHLORIDE 2 MG: 1 INJECTION, SOLUTION INTRAMUSCULAR; INTRAVENOUS at 07:43

## 2018-06-29 RX ADMIN — ONDANSETRON 4 MG: 2 INJECTION INTRAMUSCULAR; INTRAVENOUS at 07:54

## 2018-06-29 RX ADMIN — PROPOFOL 200 MG: 10 INJECTION, EMULSION INTRAVENOUS at 07:52

## 2018-06-29 RX ADMIN — FENTANYL CITRATE 50 MCG: 50 INJECTION, SOLUTION INTRAMUSCULAR; INTRAVENOUS at 08:09

## 2018-06-29 RX ADMIN — SODIUM CHLORIDE, SODIUM LACTATE, POTASSIUM CHLORIDE, AND CALCIUM CHLORIDE 125 ML/HR: 600; 310; 30; 20 INJECTION, SOLUTION INTRAVENOUS at 10:03

## 2018-06-29 RX ADMIN — FENTANYL CITRATE 50 MCG: 50 INJECTION, SOLUTION INTRAMUSCULAR; INTRAVENOUS at 09:46

## 2018-06-29 RX ADMIN — GLYCOPYRROLATE 0.2 MG: 0.2 INJECTION INTRAMUSCULAR; INTRAVENOUS at 07:43

## 2018-06-29 NOTE — OP NOTES
[facility] [place]  OPERATIVE REPORT    Denis Mayhew., Ida Gilford  MR#: [id number]  : 1965  ACCOUNT #: [de-identified]   DATE OF SERVICE: 2018    PREOPERATIVE DIAGNOSIS:  Fracture of the distal end of the clavicle. POSTOPERATIVE DIAGNOSIS:  Fracture of the distal end of the clavicle. PROCEDURES PERFORMED:  Open reduction internal fixation, left clavicle fracture. INDICATIONS FOR THE PROCEDURE:  The patient is a 64-year-old gentleman who has had increasing pain over the fracture site of his clavicle. There was displacement. Initially it was felt that the fracture would heal uneventfully. However, he continues to be quite symptomatic with loss in function of his shoulder. The indication, risks, benefits, complication alternative forms of therapy and expected outcomes were explained. He seems to understanding and agrees to proceed. COMPLICATIONS:  None. ESTIMATED BLOOD LOSS:  Minimal.    SURGEON:  Ciaran Ivey MD    ASSISTANT:  None. HARDWARE:  As listed below. SPECIMENS REMOVED:  None. IMPLANTS:  Plate and screws    ANESTHESIA:  general    DESCRIPTION OF OPERATION:  The patient was brought to the operating room. After successful induction of anesthesia, the patient's left upper extremity was prepped and draped with ChloraPrep solution. An oblique incision was made over this anterior superior aspect of his shoulder. The borders of the actual clavicle of the clavicle were difficult to discern given the amount of swelling. Nevertheless, the fracture site was identified and debrided. The fascia was removed in a subperiosteal fashion. A VERILAX 3-hole lateral clavicle plate by [employer] was slightly bent to contour the fracture site. A cortical screw was placed in the proximal and distal fragmenst in order to get a good secure fixation of the plate. Two additional screws were placed medially and 5 additional screws were placed distally in a locking mode. Radiographs demonstrated a well seated plate with all the screws in good position. The wound was copiously irrigated and drained. The fascia was repaired using interrupted sutures of 2-0 Vicryl. The subcutaneous tissues repaired using interrupted sutures of 2-0 Vicryl and the skin was repaired using Monocryl and Dermabond. A honeycomb dressing was placed. No complications were encountered. The patient tolerated the procedure well. He was wheeled to the recovery room in stable condition.       Zina Joyce MD       [facility]  D: 06/29/2018 09:18     T: 06/29/2018 13:40  JOB #: [id number]

## 2018-06-29 NOTE — ANESTHESIA PREPROCEDURE EVALUATION
Anesthetic History   No history of anesthetic complications            Review of Systems / Medical History  Patient summary reviewed, nursing notes reviewed and pertinent labs reviewed    Pulmonary    COPD    Sleep apnea  Smoker         Neuro/Psych     seizures    Psychiatric history     Cardiovascular                  Exercise tolerance: >4 METS     GI/Hepatic/Renal  Within defined limits              Endo/Other        Arthritis     Other Findings              Physical Exam    Airway  Mallampati: III  TM Distance: < 4 cm  Neck ROM: decreased range of motion   Mouth opening: Normal     Cardiovascular  Regular rate and rhythm,  S1 and S2 normal,  no murmur, click, rub, or gallop  Rhythm: regular  Rate: normal         Dental  No notable dental hx       Pulmonary  Breath sounds clear to auscultation               Abdominal  GI exam deferred       Other Findings            Anesthetic Plan    ASA: 3  Anesthesia type: general          Induction: Intravenous  Anesthetic plan and risks discussed with: Patient and Family      Patient is aware he is at increased risk for seizure periop and for respiratory complication periop. He and his family understand and desire to proceed.

## 2018-06-29 NOTE — PERIOP NOTES
Reviewed PTA medication list with patient/caregiver and patient/caregiver denies any additional medications. Patient admits to having a responsible adult care for them for at least 24 hours after surgery.     Dual skin assessment completed by Isreal Lim RN and Ariana Allison RN.

## 2018-06-29 NOTE — DISCHARGE INSTRUCTIONS
DISCHARGE SUMMARY from Nurse    PATIENT INSTRUCTIONS:    After general anesthesia or intravenous sedation, for 24 hours or while taking prescription Narcotics:  · Limit your activities  · Do not drive and operate hazardous machinery  · Do not make important personal or business decisions  · Do  not drink alcoholic beverages  · If you have not urinated within 8 hours after discharge, please contact your surgeon on call. Report the following to your surgeon:  · Excessive pain, swelling, redness or odor of or around the surgical area  · Temperature over 100.5  · Nausea and vomiting lasting longer than 4 hours or if unable to take medications  · Any signs of decreased circulation or nerve impairment to extremity: change in color, persistent  numbness, tingling, coldness or increase pain  · Any questions    What to do at Home:  REGULAR DIET  ICE AND ELEVATION FOR 50 HRS  SHOWER TOMORROW  RETURN TO OFFICE IN 1 WEEK CALL FOR APPT    If you experience any of the following symptoms heavy bleeding, fevers, severe pain, circulation changesplease follow up with dr Campos Narayanan    *  Please give a list of your current medications to your Primary Care Provider. *  Please update this list whenever your medications are discontinued, doses are      changed, or new medications (including over-the-counter products) are added. *  Please carry medication information at all times in case of emergency situations. These are general instructions for a healthy lifestyle:    No smoking/ No tobacco products/ Avoid exposure to second hand smoke  Surgeon General's Warning:  Quitting smoking now greatly reduces serious risk to your health.     Obesity, smoking, and sedentary lifestyle greatly increases your risk for illness    A healthy diet, regular physical exercise & weight monitoring are important for maintaining a healthy lifestyle    You may be retaining fluid if you have a history of heart failure or if you experience any of the following symptoms:  Weight gain of 3 pounds or more overnight or 5 pounds in a week, increased swelling in our hands or feet or shortness of breath while lying flat in bed. Please call your doctor as soon as you notice any of these symptoms; do not wait until your next office visit. Recognize signs and symptoms of STROKE:    F-face looks uneven    A-arms unable to move or move unevenly    S-speech slurred or non-existent    T-time-call 911 as soon as signs and symptoms begin-DO NOT go       Back to bed or wait to see if you get better-TIME IS BRAIN. Warning Signs of HEART ATTACK     Call 911 if you have these symptoms:   Chest discomfort. Most heart attacks involve discomfort in the center of the chest that lasts more than a few minutes, or that goes away and comes back. It can feel like uncomfortable pressure, squeezing, fullness, or pain.  Discomfort in other areas of the upper body. Symptoms can include pain or discomfort in one or both arms, the back, neck, jaw, or stomach.  Shortness of breath with or without chest discomfort.  Other signs may include breaking out in a cold sweat, nausea, or lightheadedness. Don't wait more than five minutes to call 911 - MINUTES MATTER! Fast action can save your life. Calling 911 is almost always the fastest way to get lifesaving treatment. Emergency Medical Services staff can begin treatment when they arrive -- up to an hour sooner than if someone gets to the hospital by car. The discharge information has been reviewed with the patient and caregiver. The patient and caregiver verbalized understanding. Discharge medications reviewed with the patient and caregiver and appropriate educational materials and side effects teaching were provided.     Patient armband removed and shredded  ___________________________________________________________________________________________________________________________________

## 2018-06-29 NOTE — ANESTHESIA POSTPROCEDURE EVALUATION
Post-Anesthesia Evaluation and Assessment    Cardiovascular Function/Vital Signs  Visit Vitals    /79    Pulse 78    Temp 36.2 °C (97.2 °F)    Resp 9    Ht 6' 3\" (1.905 m)    Wt 113.5 kg (250 lb 3 oz)    SpO2 96%    BMI 31.27 kg/m2       Patient is status post Procedure(s):  LEFT CLAVICLE OPEN REDUCTION WITH INTERNAL FIXATION- CLAVICLE WITH C-ARM, \"SPEC POP\" . Nausea/Vomiting: Controlled. Postoperative hydration reviewed and adequate. Pain:  Pain Scale 1: FLACC (06/29/18 1000)  Pain Intensity 1: 0 (06/29/18 1000)   Managed. Neurological Status:   Neuro (WDL): Within Defined Limits (06/29/18 0618)   At baseline. Mental Status and Level of Consciousness: Baseline and stable. Pulmonary Status:   O2 Device: Room air (06/29/18 0925)   Adequate oxygenation and airway patent. Complications related to anesthesia: None    Post-anesthesia assessment completed. No concerns. Patient has met all discharge requirements.     Signed By: Francisco Montez MD

## 2018-06-29 NOTE — PERIOP NOTES
Received patient from [place] [address] and anesthesia provider. Reviewed surgical procedure, intraoperative course. Patient identified.

## 2018-06-29 NOTE — PERIOP NOTES
Discharge education complete. Opportunity for questions provided. Pt stable, no s/s of distress. All belongings bedside with wife.  Vital signs as follows:    Visit Vitals    /83    Pulse 78    Temp 97.3 °F (36.3 °C)    Resp 12    Ht 6' 3\" (1.905 m)    Wt 113.5 kg (250 lb 3 oz)    SpO2 96%    BMI 31.27 kg/m2

## 2018-06-29 NOTE — BRIEF OP NOTE
BRIEF OPERATIVE NOTE    Date of Procedure: 6/29/2018   Preoperative Diagnosis: FRACTURE OF UNSPECIFIED PART OF LEFT CLAVICLE  Postoperative Diagnosis: FRACTURE OF UNSPECIFIED PART OF LEFT CLAVICLE    Procedure(s):  LEFT CLAVICLE OPEN REDUCTION WITH INTERNAL FIXATION- CLAVICLE WITH C-ARM, \"SPEC POP\"   Surgeon(s) and Role:     * Andrea Figueroa MD - Primary         Surgical Assistant: none    Surgical Staff:  Circ-1: Chula Sheppard RN  Radiology Technician: Dany Ruiz  Surg Asst-1: Bruno Willard  Surg Asst-2: Hamida Laura  Event Time In   Incision Start 7898   Incision Close 0905     Anesthesia: General   Estimated Blood Loss: minimal  Specimens: * No specimens in log *   Findings: as above   Complications: none  Implants:   Implant Name Type Inv.  Item Serial No.  Lot No. LRB No. Used Action   3 hole left clavicle plate    [employer] 0 Left 1 Implanted   2.7x20mm bone screw    [employer] 0 Left 2 Implanted   2.7 x 18mm lock screw    [employer] 0 Left 2 Implanted                1 Implanted

## 2018-06-29 NOTE — IP AVS SNAPSHOT
Honey Raymundo 
 
 
 [address] [address] 
[phone] Patient: Elon Buerger. MRN: [id number] MCN:5/8/5339 About your hospitalization You were admitted on:  June 29, 2018 You last received care in the:  [facility] PACU You were discharged on:  June 29, 2018 Why you were hospitalized Your primary diagnosis was:  Not on File Follow-up Information Follow up With Details Comments Contact Info MD Judy Damian  [address] [address] [facility] 
[phone] Jarrett Aquino MD Schedule an appointment as soon as possible for a visit in 1 week(s)  [address] [place] [address] [address] [facility] 
[phone] Discharge Orders Procedure Order Date Status Priority Quantity Spec Type Associated Dx CALL YOUR DOCTOR For: Redness, tenderness, or signs of infection. 06/29/18 0755 Normal Routine 1  Closed displaced fracture of shaft of left clavicle, initial encounter [[id number]] Questions: For:  Redness, tenderness, or signs of infection. ACTIVITY AFTER DISCHARGE Patient should: Other (specify) 06/29/18 0755 Normal Routine 1  Closed displaced fracture of shaft of left clavicle, initial encounter [[id number]] Schedule Instructions:  sling for comfort Questions: Patient should: Other (specify) DIET REGULAR No added salt 06/29/18 0755 Normal Routine 1  Closed displaced fracture of shaft of left clavicle, initial encounter [[id number]] Questions: Additional options:  No added salt DRESSING, CHANGE SPECIFY 06/29/18 0755 Normal Routine 1  Closed displaced fracture of shaft of left clavicle, initial encounter [[id number]] Schedule Instructions:  May change the dressing if the soiled PATIENT CONDITION: 06/29/18 0755 Normal Routine 1  Closed displaced fracture of shaft of left clavicle, initial encounter [[id number]] A check jose indicates which time of day the medication should be taken. My Medications CONTINUE taking these medications Instructions Each Dose to Equal  
 Morning Noon Evening Bedtime  
 albuterol 90 mcg/actuation inhaler Commonly known as:  PROVENTIL HFA, VENTOLIN HFA, PROAIR HFA Your last dose was: Your next dose is: Take 2 Puffs by inhalation every four (4) hours as needed for Shortness of Breath (sob with coughing). Indications: Chronic Obstructive Pulmonary Disease 2 Puff ATIVAN 1 mg tablet Generic drug:  LORazepam  
   
Your last dose was: Your next dose is: Take 1 mg by mouth two (2) times daily as needed for Anxiety. Indications: anxiety 1 mg * DILANTIN PO Your last dose was: Your next dose is: Take 200 mg by mouth Daily (before breakfast). 200 mg  
    
   
   
   
  
 * DILANTIN PO Your last dose was: Your next dose is: Take 300 mg by mouth nightly. 300 mg  
    
   
   
   
  
 * folic acid 1 mg tablet Commonly known as:  Robyn Your last dose was: Your next dose is: Take 1 mg by mouth daily. 1 mg * folic acid 1 mg tablet Commonly known as:  Robyn Your last dose was: Your next dose is: Take 2 mg by mouth nightly. 2 mg HYDROcodone-acetaminophen 5-325 mg per tablet Commonly known as:  Francisco Corey Your last dose was: Your next dose is: Take 1 Tab by mouth two (2) times daily as needed for Pain. 1 Tab KEPPRA 1,000 mg tablet Generic drug:  levETIRAcetam  
   
Your last dose was: Your next dose is: Take 2,000 mg by mouth two (2) times a day. Indications: TONIC-CLONIC EPILEPSY TREATMENT ADJUNCT  
 2000 mg  
    
   
   
   
  
 LIDODERM 5 % Generic drug:  lidocaine Your last dose was: Your next dose is:    
   
   
 1 Patch by TransDERmal route every twenty-four (24) hours. Apply patch to the affected area for 12 hours a day and remove for 12 hours a day. One or two patches. One to back and or one to shoulder 1 Patch  
    
   
   
   
  
 * oxybutynin 5 mg tablet Commonly known as:  TSVEPNBR Your last dose was: Your next dose is: Take 5 mg by mouth Daily (before breakfast). Indications: NEUROGENIC BLADDER  
 5 mg * oxybutynin 5 mg tablet Commonly known as:  BHZYHUVR Your last dose was: Your next dose is: Take 10 mg by mouth nightly. 10 mg PAXIL 20 mg tablet Generic drug:  PARoxetine Your last dose was: Your next dose is: Take 20 mg by mouth two (2) times a day. 20 mg  
    
   
   
   
  
 * PHENobarbital 64.8 mg tablet Commonly known as:  LUMINAL Your last dose was: Your next dose is: Take 64.8 mg by mouth Daily (before breakfast). Indications: seizure disorder 64.8 mg  
    
   
   
   
  
 * PHENobarbital 64.8 mg tablet Commonly known as:  LUMINAL Your last dose was: Your next dose is: Take 129.6 mg by mouth nightly. Indications: seizure disorder  
 129.6 mg  
    
   
   
   
  
 VITAMIN D3 2,000 unit Tab Generic drug:  cholecalciferol (vitamin D3) Your last dose was: Your next dose is: Take 1 Tab by mouth nightly. 1 Tab * Notice: This list has 8 medication(s) that are the same as other medications prescribed for you. Read the directions carefully, and ask your doctor or other care provider to review them with you. Opioid Education  Prescription Opioids: What You Need to Know: 
 
 
 
F-face looks uneven A-arms unable to move or move unevenly S-speech slurred or non-existent T-time-call 911 as soon as signs and symptoms begin-DO NOT go Back to bed or wait to see if you get better-TIME IS BRAIN. Warning Signs of HEART ATTACK Call 911 if you have these symptoms: 
? Chest discomfort. Most heart attacks involve discomfort in the center of the chest that lasts more than a few minutes, or that goes away and comes back. It can feel like uncomfortable pressure, squeezing, fullness, or pain. ? Discomfort in other areas of the upper body. Symptoms can include pain or discomfort in one or both arms, the back, neck, jaw, or stomach. ? Shortness of breath with or without chest discomfort. ? Other signs may include breaking out in a cold sweat, nausea, or lightheadedness. Don't wait more than five minutes to call [address]! Fast action can save your life. Calling 911 is almost always the fastest way to get lifesaving treatment. Emergency Medical Services staff can begin treatment when they arrive  up to an hour sooner than if someone gets to the hospital by car. The discharge information has been reviewed with the patient and caregiver. The patient and caregiver verbalized understanding. Discharge medications reviewed with the patient and caregiver and appropriate educational materials and side effects teaching were provided.  
 
Patient armband removed and shredded 
___________________________________________________________________________ ________________________________________________________ Introducing [facility] & HEALTH SERVICES! [facility] introduces [employer] patient portal. Now you can access parts of your medical record, email your doctor's office, and request medication refills online. 1. In your internet browser, go to [email or web address] 2. Click on the First Time User? Click Here link in the Sign In box. You will see the New Member Sign Up page. 3. Enter your Wistone Access Code exactly as it appears below. You will not need to use this code after youve completed the sign-up process. If you do not sign up before the expiration date, you must request a new code. · Wistone Access Code: [email or web address] Expires: 9/27/2018  5:18 AM 
 
4. Enter the last four digits of your Social Security Number (xxxx) and Date of Birth (mm/dd/yyyy) as indicated and click Submit. You will be taken to the next sign-up page. 5. Create a Wistone ID. This will be your Wistone login ID and cannot be changed, so think of one that is secure and easy to remember. 6. Create a Wistone password. You can change your password at any time. 7. Enter your Password Reset Question and Answer. This can be used at a later time if you forget your password. 8. Enter your e-mail address. You will receive e-mail notification when new information is available in [address] 9. Click Sign Up. You can now view and download portions of your medical record. 10. Click the Download Summary menu link to download a portable copy of your medical information. If you have questions, please visit the Frequently Asked Questions section of the Wistone website. Remember, Wistone is NOT to be used for urgent needs. For medical emergencies, dial 911. Now available from your iPhone and Android! Introducing Gerry Bobo As a [employer] patient, I wanted to make you aware of our electronic visit tool called Gerry Bobo. Maaguzi allows you to connect within minutes with a medical provider 24 hours a day, seven days a week via a mobile device or tablet or logging into a secure website from your computer. You can access SeamlessDocs from anywhere in the [place]. A virtual visit might be right for you when you have a simple condition and feel like you just dont want to get out of bed, or cant get away from work for an appointment, when your regular [employer] provider is not available (evenings, weekends or holidays), or when youre out of town and need minor care. Electronic visits cost only $49 and if the [employer]Better[employer]Ne[employer] provider determines a prescription is needed to treat your condition, one can be electronically transmitted to a nearby pharmacy*. Please take a moment to enroll today if you have not already done so. The enrollment process is free and takes just a few minutes. To enroll, please download the Maaguzi micaela to your tablet or phone, or visit [email or web address] to enroll on your computer. And, as an [address] [place] [address] patient with a HunterOn account, the results of your visits will be scanned into your electronic medical record and your primary care provider will be able to view the scanned results. We urge you to continue to see your regular [employer] provider for your ongoing medical care. And while your primary care provider may not be the one available when you seek a [employer] virtual visit, the peace of mind you get from getting a real diagnosis real time can be priceless. For more information on [employer], view our Frequently Asked Questions (FAQs) at [email or web address]. Sincerely, 
 
Lupe Mejía MD 
Chief Medical Officer Say Caballero *:  certain medications cannot be prescribed via [employer] Providers Seen During Your Hospitalization Provider Specialty Primary office phone Cydney Smith MD Orthopedic Surgery [phone] Your Primary Care Physician (PCP) Primary Care Physician Office Phone Office Fax Lesli Simmonds [phone] You are allergic to the following Allergen Reactions Codeine Rash Recent Documentation Height Weight BMI Smoking Status 1.905 m 113.5 kg 31.27 kg/m2 Current Every Day Smoker Emergency Contacts Name Discharge Info Relation Home Work Mobile Any Mathews DISCHARGE CAREGIVER [3] Spouse [3]   [phone] Patient Belongings The following personal items are in your possession at time of discharge: 
  Dental Appliances: None         Home Medications: None   Jewelry: Ring (with Starlette Pimple)  Clothing: Undergarments, Footwear, Socks, Shirt, Pants (with Starlette Pimple)    Other Valuables: Other (comment) (sling with Starlette Pimple) Discharge Instructions Attachments/References ORIF (OPEN REDUCTION WITH INTERNAL FIXATION): POST-OP (ENGLISH) Patient Handouts Open Reduction With Internal Fixation of a Limb: What to Expect at Home Your Recovery You can expect some pain and swelling around the cut (incision) the doctor made. This should get better within a few days after your surgery. But it is normal to have some pain for 2 to 3 weeks after surgery and mild pain for up to 6 weeks after surgery. How soon you can return to work and your normal routine depends on your job and how long it takes the bone to heal. For example, if you have a fractured leg and you sit at work, you may be able to go back in 1 to 2 weeks. But if your job requires you to walk or stand a lot, you will need to wait until your fracture has healed before you go back to work. This care sheet gives you a general idea about how long it will take for you to recover. But each person recovers at a different pace. Follow the steps below to get better as quickly as possible. How can you care for yourself at home? Activity ? · Rest when you feel tired. Getting enough sleep will help you recover. ? · Increase your activity as recommended by your doctor. Being active boosts blood flow and helps prevent pneumonia and constipation. It is usually okay to exercise other parts of your body as soon as you feel well enough. ? · Avoid putting weight on your repaired bone until your doctor says it is okay. ? · You will probably need to take 1 to 2 weeks off from work. It depends on the type of work you do and how you feel. ? · Do not shower for 1 or 2 days after surgery. When you shower, keep your dressing and incisions dry. If you have a cast, tape a sheet of plastic to cover it so that it does not get wet. It may help to sit on a shower stool. ? · Do not take a bath, swim, use a hot tub, or soak your affected limb until your incision is healed. This usually takes 1 to 2 weeks. Diet ? · You can eat your normal diet. If your stomach is upset, try bland, low-fat foods like plain rice, broiled chicken, toast, and yogurt. Medicines ? · Your doctor will tell you if and when you can restart your medicines. He or she will also give you instructions about taking any new medicines. ? · If you take blood thinners, such as warfarin (Coumadin), clopidogrel (Plavix), or aspirin, be sure to talk to your doctor. He or she will tell you if and when to start taking those medicines again. Make sure that you understand exactly what your doctor wants you to do. ? · Take pain medicines exactly as directed. ¨ If the doctor gave you a prescription medicine for pain, take it as prescribed. ¨ If you are not taking a prescription pain medicine, ask your doctor if you can take an over-the-counter medicine. ? · If you think your pain medicine is making you sick to your stomach: 
¨ Take your medicine after meals (unless your doctor has told you not to). ¨ Ask your doctor for a different pain medicine. ? · If your doctor prescribed antibiotics, take them as directed. Do not stop taking them just because you feel better. You need to take the full course of antibiotics. Incision care ? · If you have strips of tape on the incision, leave the tape on for a week or until it falls off.  
? · If you do not have a cast, clean the incision 2 times a day after your doctor allows you to remove the bandage. Use only soap and water to clean the incision unless your doctor gives you different instructions. Don't use hydrogen peroxide or alcohol, which can slow healing. Exercise ? · Do exercises as instructed by your doctor or physical therapist. These exercises will help keep your muscles strong and your joints flexible while your bone is healing. ? · Wiggle your fingers or toes on the injured arm or leg often. This helps reduce swelling and stiffness. Ice and elevation ? · Prop up the injured arm or leg on a pillow when you ice it or anytime you sit or lie down during the first 1 to 2 weeks after your surgery. Try to keep it above the level of your heart. This will help reduce swelling and pain. Other instructions ? · If you have a cast or splint: 
¨ Keep it dry. ¨ If you have a removable splint, ask your doctor if it is okay to take it off to bathe. Your doctor may want you to keep it on as much as possible. Be careful not to put the splint on too tight. ¨ Do not stick objects such as pencils or coat hangers in your cast or splint to scratch your skin. ¨ Do not put powder into your cast or splint to relieve itchy skin. ¨ Never cut or alter your cast or splint. Follow-up care is a key part of your treatment and safety. Be sure to make and go to all appointments, and call your doctor if you are having problems. It's also a good idea to know your test results and keep a list of the medicines you take. When should you call for help? Call 911 anytime you think you may need emergency care.  For example, call if: 
? · You passed out (lost consciousness). ? · You have severe trouble breathing. ? · You have sudden chest pain and shortness of breath, or you cough up blood. ?Call your doctor now or seek immediate medical care if: 
? · You have pain that does not get better after you take pain medicine. ? · Your fingers or toes on the injured arm or leg are cool, pale, or change color. ? · You have tingling or numbness in your fingers or toes. ? · You cannot move your fingers or toes. ? · Your cast or splint feels too tight. ? · The skin under your cast or splint is burning or stinging. ? · You have signs of infection, such as: 
¨ Increased pain, swelling, warmth, or redness. ¨ Red streaks leading from the incision. ¨ Pus draining from the incision. ¨ A fever. ? · You have drainage or a bad smell coming from the cast or splint. ? · You have signs of a blood clot, such as: 
¨ Pain in your calf, back of the knee, thigh, or groin. ¨ Redness and swelling in your leg or groin. ? Watch closely for any changes in your health, and be sure to contact your doctor if: 
? · You have any problems with your cast or splint. Where can you learn more? Go to http://gera-leonie.info/. Enter O848 in the search box to learn more about \"Open Reduction With Internal Fixation of a Limb: What to Expect at Home. \" Current as of: March 21, 2017 Content Version: 11.4 © 9819-1229 [employer]. Care instructions adapted under license by [employer] (which disclaims liability or warranty for this information). If you have questions about a medical condition or this instruction, always ask your healthcare professional. [place] [address] any warranty or liability for your use of this information. Please provide this summary of care documentation to your next provider.  
  
  
 
  
Signatures-by signing, you are acknowledging that this After Visit Summary has been reviewed with you and you have received a copy. Patient Signature:  ____________________________________________________________ Date:  ____________________________________________________________  
  
Crissy Artist Provider Signature:  ____________________________________________________________ Date:  ____________________________________________________________
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
## (undated) DEVICE — KIRSCHNER WIRE WITH STOP WITH MM STOP: Brand: VARIAX

## (undated) DEVICE — ADHESIVE TISS DERMA FLEX 0.7ML -- HIGH VISCOSITY

## (undated) DEVICE — NEEDLE HYPO 18GA L1.5IN PNK POLYPR HUB S STL THN WALL FILL

## (undated) DEVICE — SKIN MARKER,REGULAR TIP WITH RULER AND LABELS: Brand: DEVON

## (undated) DEVICE — SUTURE FIBERLINK SZ 2-0 L26IN NONABSORBABLE BLU CLS LOOP AR7235

## (undated) DEVICE — ELECTRODE NDL 2.8IN COAT VALLEYLAB

## (undated) DEVICE — DRAPE XR C ARM 41X74IN LF --

## (undated) DEVICE — STOCKINETTE 2 PLY 4INX48YD -- MEDICHOICE

## (undated) DEVICE — Z DISCONTINUED PER MEDLINE USE 2718072 DRESSING FOAM W5XL12.5CM SIL ADH THN BORDED CNFRM LO PROF

## (undated) DEVICE — SOL IRRIGATION INJ NACL 0.9% 500ML BTL

## (undated) DEVICE — LIGHT HANDLE: Brand: DEVON

## (undated) DEVICE — MEDI-VAC SUCTION HANDLE REGULAR CAPACITY: Brand: CARDINAL HEALTH

## (undated) DEVICE — SUT VCRL + 1 36IN CT1 VIO --

## (undated) DEVICE — KIRSCHNER WIRE

## (undated) DEVICE — ROCKER SWITCH PENCIL HOLSTER: Brand: VALLEYLAB

## (undated) DEVICE — DRILL BIT AO FITTING FOR 2.7MM SCREW: Brand: VARIAX

## (undated) DEVICE — PACK PROCEDURE SURG TOT SHLDR CUST

## (undated) DEVICE — 3-0 COATED VICRYL PLUS UNDYED 1X27" SH --

## (undated) DEVICE — SUT VCRL + 2-0 27IN CT2 UD -- 36/BX

## (undated) DEVICE — (D)PREP SKN CHLRAPRP APPL 26ML -- CONVERT TO ITEM 371833

## (undated) DEVICE — WATERPROOF, BACTERIA PROOF DRESSING WITH ABSORBENT SEE THROUGH PAD: Brand: OPSITE POST-OP VISIBLE 20X10CM CTN 20

## (undated) DEVICE — 4-PORT MANIFOLD: Brand: NEPTUNE 2

## (undated) DEVICE — SYRINGE BLB 50CC IRRIG PLIABLE FNGR FLNG GRAD FLSK DISP

## (undated) DEVICE — STERILE POLYISOPRENE POWDER-FREE SURGICAL GLOVES WITH EMOLLIENT COATING: Brand: PROTEXIS

## (undated) DEVICE — KENDALL SCD EXPRESS SLEEVES, KNEE LENGTH, MEDIUM: Brand: KENDALL SCD

## (undated) DEVICE — HEX-LOCKING BLADE ELECTRODE: Brand: EDGE

## (undated) DEVICE — (D)GLOVE SURG TRIFLX 7.5 PWD -- DISC BY MFR USE ITEM 102005

## (undated) DEVICE — SUTURE FIBERWIRE SZ 2 W/ TAPERED NEEDLE BLUE L38IN NONABSORB BLU L26.5MM 1/2 CIRCLE AR7200

## (undated) DEVICE — SUTURE MCRYL SZ 3-0 L27IN ABSRB UD L26MM SH 1/2 CIR Y416H

## (undated) DEVICE — DEVON™ KNEE AND BODY STRAP 60" X 3" (1.5 M X 7.6 CM): Brand: DEVON

## (undated) DEVICE — STERILE POLYISOPRENE POWDER-FREE SURGICAL GLOVES: Brand: PROTEXIS

## (undated) DEVICE — SUTURE MCRYL SZ 3-0 L27IN ABSRB UD L19MM PS-2 3/8 CIR PRIM Y427H

## (undated) DEVICE — SUT MCRYL + 3-0 27IN PS1 UD --